# Patient Record
Sex: MALE | Race: WHITE | Employment: FULL TIME | URBAN - METROPOLITAN AREA
[De-identification: names, ages, dates, MRNs, and addresses within clinical notes are randomized per-mention and may not be internally consistent; named-entity substitution may affect disease eponyms.]

---

## 2024-09-05 ENCOUNTER — HOSPITAL ENCOUNTER (EMERGENCY)
Age: 34
Discharge: HOME | End: 2024-09-05
Payer: MEDICAID

## 2024-09-05 ENCOUNTER — APPOINTMENT (OUTPATIENT)
Dept: GENERAL RADIOLOGY | Age: 34
End: 2024-09-05
Payer: MEDICAID

## 2024-09-05 VITALS
WEIGHT: 120 LBS | SYSTOLIC BLOOD PRESSURE: 109 MMHG | BODY MASS INDEX: 21.26 KG/M2 | RESPIRATION RATE: 20 BRPM | HEART RATE: 77 BPM | HEIGHT: 63 IN | OXYGEN SATURATION: 98 % | DIASTOLIC BLOOD PRESSURE: 62 MMHG | TEMPERATURE: 98 F

## 2024-09-05 DIAGNOSIS — J01.90 ACUTE NON-RECURRENT SINUSITIS, UNSPECIFIED LOCATION: Primary | ICD-10-CM

## 2024-09-05 LAB
FLUAV RNA SPEC QL NAA+PROBE: NOT DETECTED
FLUBV RNA SPEC QL NAA+PROBE: NOT DETECTED
SARS-COV-2 RDRP RESP QL NAA+PROBE: NOT DETECTED
SOURCE: NORMAL

## 2024-09-05 PROCEDURE — 6360000002 HC RX W HCPCS

## 2024-09-05 PROCEDURE — 96372 THER/PROPH/DIAG INJ SC/IM: CPT

## 2024-09-05 PROCEDURE — 71046 X-RAY EXAM CHEST 2 VIEWS: CPT

## 2024-09-05 PROCEDURE — 87635 SARS-COV-2 COVID-19 AMP PRB: CPT

## 2024-09-05 PROCEDURE — 99284 EMERGENCY DEPT VISIT MOD MDM: CPT

## 2024-09-05 PROCEDURE — 87502 INFLUENZA DNA AMP PROBE: CPT

## 2024-09-05 RX ORDER — PREDNISONE 50 MG/1
50 TABLET ORAL DAILY
Qty: 5 TABLET | Refills: 0 | Status: SHIPPED | OUTPATIENT
Start: 2024-09-05 | End: 2024-09-10

## 2024-09-05 RX ORDER — BROMPHENIRAMINE MALEATE, PSEUDOEPHEDRINE HYDROCHLORIDE, AND DEXTROMETHORPHAN HYDROBROMIDE 2; 30; 10 MG/5ML; MG/5ML; MG/5ML
5 SYRUP ORAL 4 TIMES DAILY PRN
Qty: 118 ML | Refills: 0 | Status: SHIPPED | OUTPATIENT
Start: 2024-09-05 | End: 2024-09-10

## 2024-09-05 RX ORDER — DEXAMETHASONE SODIUM PHOSPHATE 10 MG/ML
10 INJECTION INTRAMUSCULAR; INTRAVENOUS ONCE
Status: COMPLETED | OUTPATIENT
Start: 2024-09-05 | End: 2024-09-05

## 2024-09-05 RX ORDER — FLUTICASONE PROPIONATE 50 MCG
1 SPRAY, SUSPENSION (ML) NASAL DAILY
Qty: 32 G | Refills: 1 | Status: SHIPPED | OUTPATIENT
Start: 2024-09-05

## 2024-09-05 RX ORDER — CETIRIZINE HYDROCHLORIDE 10 MG/1
10 TABLET ORAL DAILY
Qty: 30 TABLET | Refills: 0 | Status: SHIPPED | OUTPATIENT
Start: 2024-09-05

## 2024-09-05 RX ADMIN — DEXAMETHASONE SODIUM PHOSPHATE 10 MG: 10 INJECTION INTRAMUSCULAR; INTRAVENOUS at 23:05

## 2024-09-05 ASSESSMENT — LIFESTYLE VARIABLES
HOW OFTEN DO YOU HAVE A DRINK CONTAINING ALCOHOL: NEVER
HOW MANY STANDARD DRINKS CONTAINING ALCOHOL DO YOU HAVE ON A TYPICAL DAY: PATIENT DOES NOT DRINK

## 2024-09-05 ASSESSMENT — PAIN - FUNCTIONAL ASSESSMENT: PAIN_FUNCTIONAL_ASSESSMENT: 0-10

## 2024-09-05 ASSESSMENT — PAIN SCALES - GENERAL: PAINLEVEL_OUTOF10: 3

## 2024-09-06 NOTE — ED TRIAGE NOTES
C/o H/A, cough. 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 Congestion that is worse at night. Concerned for sinus infection. Did have covid approx 3 wks ago.

## 2024-09-06 NOTE — ED NOTES
Patient mobility status  with no difficulty. Provider aware     I have reviewed discharge instructions with the patient.  The patient verbalized understanding.    Patient left ED via Discharge Method: ambulatory to Home with self    Opportunity for questions and clarification provided.     Patient given 5 scripts.

## 2024-09-06 NOTE — ED PROVIDER NOTES
Emergency Department Provider Note       PCP: No, Pcp   Age: 33 y.o.   Sex: male     DISPOSITION Decision To Discharge 09/05/2024 11:16:43 PM  Condition at Disposition: Good       ICD-10-CM    1. Acute non-recurrent sinusitis, unspecified location  J01.90           Medical Decision Making     Patient is a 33-year-old male presenting with continued congestion and cough after diagnosis with COVID 1 month ago.  Patient states his symptoms have persisted and acutely worsened over the past 3 weeks.      On presentation, patient is afebrile, signs are stable, and he is.  In no acute distress.  He has clear postnasal drip present without any tonsillar edema, exudate, uvular deviation, or evidence of peritonsillar abscess.  He has tenderness to palpation to bilateral maxillary and frontal sinuses.  No nuchal rigidity or meningeal signs.  Lungs clear to auscultation in all fields without crackles, wheezes, or other adventitious sounds.    Rapid flu and COVID are negative.  Chest x-ray does not reveal any evidence of pneumonia, did give shot of Decadron for patient's congestion.     Due to stable vital signs and nontoxic appearance, no evidence of meningitis, sepsis, SIRS, or any other emergent pathology plan for this patient as continued outpatient management.  As this patient's symptoms have been ongoing for the past 3 weeks and he does have tenderness to his sinuses, will prescribe him antibiotics for acute sinusitis.  Will also send him home with a prednisone burst to help with congestion and inflammation.  Advised continued use of Zyrtec and Flonase nasal spray as well.  Will also send him home with Bromfed for congestion and cough.  He will follow-up with his primary care provider to ensure improvement in his symptoms.  He was given strict return precautions.  He verbalized understanding with plan of care and left the facility in stable condition.     1 acute complicated illness or injury.  Prescription drug

## 2024-09-06 NOTE — DISCHARGE INSTRUCTIONS
Please begin taking the antibiotics daily as prescribed as well as the steroids.  Begin using the daily Zyrtec and Flonase nasal spray, you can continue these medications as they well for allergies.  Use the Bromfed as needed for cough and congestion.  Follow-up with your primary care provider to ensure improvement in your symptoms.  Return to the ED with any new or worsening.

## 2024-10-02 ENCOUNTER — APPOINTMENT (OUTPATIENT)
Dept: CT IMAGING | Age: 34
End: 2024-10-02
Payer: MEDICAID

## 2024-10-02 ENCOUNTER — HOSPITAL ENCOUNTER (EMERGENCY)
Age: 34
Discharge: HOME | End: 2024-10-02
Payer: MEDICAID

## 2024-10-02 VITALS
SYSTOLIC BLOOD PRESSURE: 108 MMHG | RESPIRATION RATE: 18 BRPM | HEIGHT: 63 IN | TEMPERATURE: 98.3 F | HEART RATE: 69 BPM | BODY MASS INDEX: 22.15 KG/M2 | WEIGHT: 125 LBS | OXYGEN SATURATION: 97 % | DIASTOLIC BLOOD PRESSURE: 63 MMHG

## 2024-10-02 DIAGNOSIS — M54.12 CERVICAL RADICULOPATHY: Primary | ICD-10-CM

## 2024-10-02 DIAGNOSIS — G56.02 CARPAL TUNNEL SYNDROME OF LEFT WRIST: ICD-10-CM

## 2024-10-02 PROCEDURE — 6360000002 HC RX W HCPCS: Performed by: STUDENT IN AN ORGANIZED HEALTH CARE EDUCATION/TRAINING PROGRAM

## 2024-10-02 PROCEDURE — 99284 EMERGENCY DEPT VISIT MOD MDM: CPT

## 2024-10-02 PROCEDURE — 72125 CT NECK SPINE W/O DYE: CPT

## 2024-10-02 PROCEDURE — 96372 THER/PROPH/DIAG INJ SC/IM: CPT

## 2024-10-02 RX ORDER — KETOROLAC TROMETHAMINE 30 MG/ML
30 INJECTION, SOLUTION INTRAMUSCULAR; INTRAVENOUS ONCE
Status: COMPLETED | OUTPATIENT
Start: 2024-10-02 | End: 2024-10-02

## 2024-10-02 RX ORDER — PREDNISONE 50 MG/1
50 TABLET ORAL DAILY
Qty: 5 TABLET | Refills: 0 | Status: SHIPPED | OUTPATIENT
Start: 2024-10-02 | End: 2024-10-07

## 2024-10-02 RX ORDER — DEXAMETHASONE SODIUM PHOSPHATE 10 MG/ML
10 INJECTION INTRAMUSCULAR; INTRAVENOUS ONCE
Status: COMPLETED | OUTPATIENT
Start: 2024-10-02 | End: 2024-10-02

## 2024-10-02 RX ORDER — MELOXICAM 15 MG/1
15 TABLET ORAL DAILY
Qty: 30 TABLET | Refills: 0 | Status: SHIPPED | OUTPATIENT
Start: 2024-10-02 | End: 2024-11-01

## 2024-10-02 RX ADMIN — KETOROLAC TROMETHAMINE 30 MG: 30 INJECTION, SOLUTION INTRAMUSCULAR at 19:07

## 2024-10-02 RX ADMIN — DEXAMETHASONE SODIUM PHOSPHATE 10 MG: 10 INJECTION INTRAMUSCULAR; INTRAVENOUS at 19:05

## 2024-10-02 ASSESSMENT — PAIN DESCRIPTION - LOCATION
LOCATION: NECK;HAND
LOCATION: HAND;RIB CAGE

## 2024-10-02 ASSESSMENT — LIFESTYLE VARIABLES
HOW MANY STANDARD DRINKS CONTAINING ALCOHOL DO YOU HAVE ON A TYPICAL DAY: PATIENT DOES NOT DRINK
HOW OFTEN DO YOU HAVE A DRINK CONTAINING ALCOHOL: NEVER

## 2024-10-02 ASSESSMENT — ENCOUNTER SYMPTOMS
ABDOMINAL PAIN: 0
COUGH: 0
TROUBLE SWALLOWING: 0
VOMITING: 0
PHOTOPHOBIA: 0
SHORTNESS OF BREATH: 0
FACIAL SWELLING: 0

## 2024-10-02 ASSESSMENT — PAIN SCALES - GENERAL
PAINLEVEL_OUTOF10: 10
PAINLEVEL_OUTOF10: 10

## 2024-10-02 ASSESSMENT — PAIN DESCRIPTION - ORIENTATION
ORIENTATION: LEFT
ORIENTATION: RIGHT;LEFT

## 2024-10-02 ASSESSMENT — PAIN - FUNCTIONAL ASSESSMENT: PAIN_FUNCTIONAL_ASSESSMENT: 0-10

## 2024-10-02 ASSESSMENT — PAIN DESCRIPTION - PAIN TYPE: TYPE: ACUTE PAIN

## 2024-10-02 NOTE — ED TRIAGE NOTES
Patient complains of pain from hands to elbows and up left side. 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

## 2024-10-02 NOTE — ED PROVIDER NOTES
10 mg (10 mg IntraMUSCular Given 10/2/24 1905)   ketorolac (TORADOL) injection 30 mg (30 mg IntraMUSCular Given 10/2/24 1907)       New Prescriptions    MELOXICAM (MOBIC) 15 MG TABLET    Take 1 tablet by mouth daily    PREDNISONE (DELTASONE) 50 MG TABLET    Take 1 tablet by mouth daily for 5 days        History reviewed. No pertinent past medical history.     History reviewed. No pertinent surgical history.     Social History     Socioeconomic History    Marital status: Single     Spouse name: None    Number of children: None    Years of education: None    Highest education level: None   Tobacco Use    Smoking status: Every Day     Types: Cigarettes    Smokeless tobacco: Never   Substance and Sexual Activity    Alcohol use: Not Currently    Drug use: Never     Social Determinants of Health     Financial Resource Strain: Medium Risk (7/25/2023)    [facility]    Overall Financial Resource Strain (CARDIA)     Difficulty of Paying Living Expenses: Somewhat hard   Food Insecurity: No Food Insecurity (7/25/2023)    [facility]    Hunger Vital Sign     Worried About Running Out of Food in the Last Year: Never true     Ran Out of Food in the Last Year: Never true   Transportation Needs: No Transportation Needs (7/25/2023)    [facility]    PRAPARE - Transportation     Lack of Transportation (Medical): No     Lack of Transportation (Non-Medical): No        Previous Medications    CETIRIZINE (ZYRTEC) 10 MG TABLET    Take 1 tablet by mouth daily    FLUTICASONE (FLONASE) 50 MCG/ACT NASAL SPRAY    1 spray by Each Nostril route daily        Results for orders placed or performed during the hospital encounter of 10/02/24   CT CSpine W/O Contrast    Narrative    Clinical History/Indication for Exam:  Left-sided neck pain radiating down to the hand : Left-sided neck pain radiating   down to the hand    CT CERVICAL SPINE WITHOUT INTRAVENOUS CONTRAST    INDICATION:  Left-sided

## 2024-10-02 NOTE — DISCHARGE INSTRUCTIONS
Use prescribed medications to control your symptoms.  Take the steroids for the full course.  Use Mobic as needed for pain.  Follow-up with orthopedics.  Referral has been placed.  You can wear wrist splints at night.  Return here for new or worsening symptoms.    As we discussed, I did not find a life threatening cause of your symptoms today. However, THAT DOES NOT MEAN IT COULD NOT DEVELOP. If you develop ANY new or worsening symptoms, it is critical that you return for re-evaluation. This includes any symptoms that are concerning to you, especially symptoms such as loss of range of motion of your arm or neck, severe pain, swelling, numbness, weakness.  If you do not return for re-evaluation, you risk serious complications, including death.